# Patient Record
Sex: FEMALE | Race: NATIVE HAWAIIAN OR OTHER PACIFIC ISLANDER | NOT HISPANIC OR LATINO | URBAN - METROPOLITAN AREA
[De-identification: names, ages, dates, MRNs, and addresses within clinical notes are randomized per-mention and may not be internally consistent; named-entity substitution may affect disease eponyms.]

---

## 2017-11-22 ENCOUNTER — HOSPITAL ENCOUNTER (EMERGENCY)
Facility: MEDICAL CENTER | Age: 57
End: 2017-11-22
Attending: EMERGENCY MEDICINE

## 2017-11-22 VITALS
SYSTOLIC BLOOD PRESSURE: 156 MMHG | RESPIRATION RATE: 16 BRPM | OXYGEN SATURATION: 96 % | TEMPERATURE: 97.6 F | DIASTOLIC BLOOD PRESSURE: 74 MMHG | BODY MASS INDEX: 44.57 KG/M2 | WEIGHT: 277.34 LBS | HEIGHT: 66 IN | HEART RATE: 86 BPM

## 2017-11-22 DIAGNOSIS — R22.0 GINGIVAL SWELLING: ICD-10-CM

## 2017-11-22 PROCEDURE — 99283 EMERGENCY DEPT VISIT LOW MDM: CPT

## 2017-11-22 RX ORDER — PENICILLIN V POTASSIUM 500 MG/1
500 TABLET ORAL 4 TIMES DAILY
Qty: 40 TAB | Refills: 0 | Status: SHIPPED | OUTPATIENT
Start: 2017-11-22

## 2017-11-22 RX ORDER — ACETAMINOPHEN 325 MG/1
650 TABLET ORAL EVERY 4 HOURS PRN
COMMUNITY

## 2017-11-22 ASSESSMENT — LIFESTYLE VARIABLES: DO YOU DRINK ALCOHOL: NO

## 2017-11-22 ASSESSMENT — PAIN SCALES - GENERAL: PAINLEVEL_OUTOF10: 7

## 2017-11-22 NOTE — ED NOTES
".  Chief Complaint   Patient presents with   • Oral Swelling     Left upper tooth ulceration, denies drainage. Reports fever at home.     • Nausea     Ambulatory to triage for above. Hx of Emergency Dental Surgery to affected tooth. New to area, doesn't have Dentist established.    Patient educated on triage process and wait time.  Instructed to notify staff for worsening or new symptoms.  Placed in lobby.      .BP (!) 167/80   Pulse 71   Temp 36.2 °C (97.2 °F)   Resp 16   Ht 1.676 m (5' 6\")   Wt (!) 125.8 kg (277 lb 5.4 oz)   SpO2 96%   BMI 44.76 kg/m²     "
Front left upper tooth pain, x 2- 3 days ago, denies seeking dentist. Increase in pain and swelling. States took a tylenol at home prior to coming to ER  
Pt dc home with Rx, vss  
Abdomen soft, nontender, nondistended, bowel sounds present in all 4 quadrants.

## 2017-11-22 NOTE — ED PROVIDER NOTES
ED Provider Note    Scribed for Garrett Gonzalez D.O. by Ruddy Arias. 11/22/2017  3:26 AM    Primary care provider: None  History obtained from: patient, patient's daughter   History limited by: None     CHIEF COMPLAINT  Chief Complaint   Patient presents with   • Oral Swelling     Left upper tooth ulceration, denies drainage. Reports fever at home.     • Nausea        HPI    Brien Costa is a 56 y.o. female who presents to the ED for evaluation of oral swelling onset 1 week ago. Per patient, her swelling is mostly in her left-upper gum. Patient denies any recent injury to the area. She endorses associated subjective fever, oral pain. The patient states her pain has been constant since onset. She has taken Tylenol with only temporary relief to her symptoms. She does not note any exacerbating factors. The patient's daughter reports the patient had a tooth removed 1 year ago in the same area, but she has not had problems since. Patient states she has not seen the dentist since her surgery 1 year ago.  She denies nausea, vomiting, diarrhea, or discharge.       REVIEW OF SYSTEMS  Please see HPI for pertinent positives/negatives.      E.       PAST MEDICAL HISTORY  Past Medical History:   Diagnosis Date   • Diabetes (CMS-HCC)         SURGICAL HISTORY  Past Surgical History:   Procedure Laterality Date   • DENTAL EXTRACTION(S)          SOCIAL HISTORY  Social History     Social History Main Topics   • Smoking status: Never Smoker   • Smokeless tobacco: Never Used   • Alcohol use No   • Drug use: No   • Sexual activity: Not on file        FAMILY HISTORY  History reviewed. No pertinent family history.     CURRENT MEDICATIONS  Home Medications     Reviewed by Kizzy Roman R.N. (Registered Nurse) on 11/22/17 at 0311  Med List Status: Complete   Medication Last Dose Status   acetaminophen (TYLENOL) 325 MG Tab 11/22/2017 Active   metformin (GLUCOPHAGE) 500 MG Tab 11/21/2017 Active                 ALLERGIES  No Known  "Allergies     PHYSICAL EXAM  VITAL SIGNS: BP (!) 167/80   Pulse 71   Temp 36.2 °C (97.2 °F)   Resp 16   Ht 1.676 m (5' 6\")   Wt (!) 125.8 kg (277 lb 5.4 oz)   SpO2 96%   BMI 44.76 kg/m²      Pulse ox interpretation: 96% I interpret this pulse ox as normal     Constitutional: Well developed, well nourished, alert in no apparent distress, nontoxic appearance   HENT: No external signs of trauma, normocephalic, area of slightly darker discoloration with swelling on the left upper gingiva with tenderness to palpation, no drainage/bleeding, oropharynx moist and clear with widely patent airway, no trismus/drooling/stridor, patient speaking with normal voice without difficulty  Eyes: No discharge, no icterus   Neck: Trachea midline, no stridor, no LAD  Cardiovascular: Strong distal pulses and good perfusion   Thorax & Lungs: No respiratory distress   Extremities: No clubbing, no cyanosis, no gross deformity, good ROM, intact distal pulses with brisk cap refill   Skin: Warm, dry, no pallor/cyanosis, no rash noted   Neuro: A/O times 3, no focal deficits noted   Psychiatric: Cooperative            COURSE & MEDICAL DECISION MAKING  Nursing notes, VS, PMSFHx reviewed in chart.     Differential diagnoses considered include but are not limited to: Dental caries, dental fracture/avulsion, abscess, gingivitis, periodontitis, stomatitis       3:34 AM - The patient was seen and examined at bedside. The patient was instructed to follow-up with her dentist as soon as possible. She will be discharged with a prescription for antibiotics. The patient was given return precautions and welcomed to return to the ED with new or worsening symptoms. She understood and verbalized agreement.         Patient presents with her daughter to the ED with above complaint. She was noted to have a small area of swelling and discoloration on the left upper gingiva suspicious for possible infection. Patient will be started on penicillin. She declined " dental block. She was advised to continue with acetaminophen/ibuprofen as needed. She was advised to follow up with dentist ASAP and given return to ED precautions. Patient otherwise in no acute distress and nontoxic in appearance.  She was noted to have elevated blood pressure and will need outpatient follow-up for further management. Patient and her daughter verbalized understanding and agreed with plan of care with no further questions or concerns.      The patient is referred to a primary physician for blood pressure management, diabetic screening, and for all other preventative health concerns.       FINAL IMPRESSION  1. Gingival swelling           DISPOSITION  Patient will be discharged home in stable condition.       FOLLOW UP  Please follow up with dentist ASAP          [facility], Emergency Dept  [address] [facility] [address] [phone]    If symptoms worsen         OUTPATIENT MEDICATIONS  Discharge Medication List as of 11/22/2017  3:37 AM      START taking these medications    Details   penicillin v potassium (VEETID) 500 MG Tab Take 1 Tab by mouth 4 times a day., Disp-40 Tab, R-0, Print Rx Paper                 IRuddy (Scribe), am scribing for, and in the presence of, Garrett Gonzalez D.O..    Electronically signed by: Ruddy Arias (Aye), 11/22/2017    IGarrett D.O. personally performed the services described in this documentation, as scribed by Ruddy Arias in my presence, and it is both accurate and complete.      Portions of this record were made with voice recognition software and by scribes.  Despite my review, spelling/grammar/context errors may still remain.  Interpretation of this chart should be taken in this context.

## 2018-06-26 ENCOUNTER — HOSPITAL ENCOUNTER (EMERGENCY)
Facility: MEDICAL CENTER | Age: 58
End: 2018-06-26
Attending: EMERGENCY MEDICINE

## 2018-06-26 VITALS
WEIGHT: 275.57 LBS | SYSTOLIC BLOOD PRESSURE: 145 MMHG | HEART RATE: 83 BPM | TEMPERATURE: 98.6 F | BODY MASS INDEX: 44.48 KG/M2 | OXYGEN SATURATION: 99 % | RESPIRATION RATE: 20 BRPM | DIASTOLIC BLOOD PRESSURE: 78 MMHG

## 2018-06-26 DIAGNOSIS — K04.7 DENTAL ABSCESS: ICD-10-CM

## 2018-06-26 PROCEDURE — 99283 EMERGENCY DEPT VISIT LOW MDM: CPT

## 2018-06-26 PROCEDURE — 303977 HCHG I & D

## 2018-06-26 RX ORDER — AMOXICILLIN AND CLAVULANATE POTASSIUM 875; 125 MG/1; MG/1
1 TABLET, FILM COATED ORAL 2 TIMES DAILY
Qty: 20 TAB | Refills: 0 | Status: SHIPPED | OUTPATIENT
Start: 2018-06-26 | End: 2018-07-06

## 2018-06-27 NOTE — ED NOTES
PT IS AAOX4, PT IS IN NAD, PT IS BEING D/C. PT WAS GIVEN D/C INSTRUCTIONS AND SHE STATED UNDERSTANDING,. PT IS ABLE TO AMB WOI ASSISTANCE. PT LEFT WITH FAMILY .

## 2018-06-27 NOTE — ED NOTES
Brien [employer]  Chief Complaint   Patient presents with   • Tooth Ache     Pt reports having tooth pain since this morning. Denies injury or trauma. Pt reports taking 1300mg APAP extended release at appx 2050hrs. Pt reports having a dental abscess in the past and this feel similar.    • Hypertension     Pt is not taking hypertension medication but has been told in the past that she has high blood pressure.       Pt ambulatory to triage with above complaint.     BP (!) 206/104 Comment: Pt states Pt has HX of HTN. Not taking any RX for it  Pulse 69   Temp 36.1 °C (96.9 °F)   Resp 18   Wt 125 kg (275 lb 9.2 oz)   SpO2 97%   BMI 44.48 kg/m²     Pt informed of triage process and encouraged to notify staff of any changes or concerns. Pt verbalized understanding of instructions. Apologized for long wait time. Pt placed back in lobby.

## 2018-06-27 NOTE — DISCHARGE INSTRUCTIONS
Abscessed Tooth  An abscessed tooth is an infection around your tooth. It may be caused by holes or damage to the tooth (cavity) or a dental disease. An abscessed tooth causes mild to very bad pain in and around the tooth. See your dentist right away if you have tooth or gum pain.  HOME CARE  · Take your medicine as told. Finish it even if you start to feel better.  · Do not drive after taking pain medicine.  · Rinse your mouth (gargle) often with salt water (¼ teaspoon salt in 8 ounces of warm water).  · Do not apply heat to the outside of your face.  GET HELP RIGHT AWAY IF:   · You have a temperature by mouth above 102° F (38.9° C), not controlled by medicine.  · You have chills and a very bad headache.  · You have problems breathing or swallowing.  · Your mouth will not open.  · You develop puffiness (swelling) on the neck or around the eye.  · Your pain is not helped by medicine.  · Your pain is getting worse instead of better.  MAKE SURE YOU:   · Understand these instructions.  · Will watch your condition.  · Will get help right away if you are not doing well or get worse.  Document Released: 06/05/2009 Document Revised: 03/11/2013 Document Reviewed: 03/27/2012  [employer]® Patient Information ©2014 [employer].

## 2018-06-27 NOTE — ED PROVIDER NOTES
ED Provider Note    Scribed for Mohamud Cowan M.D. by Ruddy Arias. 6/26/2018, 10:45 PM.    Primary care provider: None  Means of arrival: Private Vehicle  History obtained from: Patient  History limited by: None    CHIEF COMPLAINT  Chief Complaint   Patient presents with   • Tooth Ache     Pt reports having tooth pain since this morning. Denies injury or trauma. Pt reports taking 1300mg APAP extended release at appx 2050hrs. Pt reports having a dental abscess in the past and this feel similar.    • Hypertension     Pt is not taking hypertension medication but has been told in the past that she has high blood pressure.       HPI  Brien Costa is a 57 y.o. female who presents to the Emergency Department for evaluation of left-sided dental pain onset this morning. Per patient, she woke up this morning with a sudden onset of dental pain that has been constant since. She reports she has had dental abscesses in the past with similar pain. The patient does not report any associated symptoms. She does not note any exacerbating or alleviating factors. Patient denies fevers, nausea, vomiting, or facial swelling.     The patient is also complaining of hypertension. She reports she has been told she has been hypertensive in the past but is not currently taking any medications. She does not report any associated symptoms.     REVIEW OF SYSTEMS  Pertinent positives include left-sided dental pain, hypertension. Pertinent negatives include no fevers, nausea, vomiting, facial swelling.  See HPI for further details. E.     PAST MEDICAL HISTORY   has a past medical history of Diabetes (CMS-HCC) (HCC).    SURGICAL HISTORY   has a past surgical history that includes dental extraction(s).    SOCIAL HISTORY  Social History   Substance Use Topics   • Smoking status: Never Smoker   • Smokeless tobacco: Never Used   • Alcohol use No      History   Drug Use No       FAMILY HISTORY  No history pertinent to complaint.     CURRENT  MEDICATIONS  No current facility-administered medications on file prior to encounter.      Current Outpatient Prescriptions on File Prior to Encounter   Medication Sig Dispense Refill   • acetaminophen (TYLENOL) 325 MG Tab Take 650 mg by mouth every four hours as needed.     • metformin (GLUCOPHAGE) 500 MG Tab Take 1,000 mg by mouth 2 times a day, with meals.     • penicillin v potassium (VEETID) 500 MG Tab Take 1 Tab by mouth 4 times a day. 40 Tab 0      ALLERGIES  No Known Allergies    PHYSICAL EXAM  VITAL SIGNS: BP (!) 206/104 Comment: Pt states Pt has HX of HTN. Not taking any RX for it  Pulse 69   Temp 36.1 °C (96.9 °F)   Resp 18   Wt 125 kg (275 lb 9.2 oz)   SpO2 97%   BMI 44.48 kg/m²     Constitutional: Well developed, Well nourished, No acute distress, Non-toxic appearance.   HENT: Normocephalic, Atraumatic. 2 small areas of soft tissue swelling surrounding the canine on the maxilla. No buccal swelling or posterior pharyngeal swelling.   Cardiovascular: Regular pulse  Lungs: No respiratory distress  Skin: Warm, Dry, no rash  Extremities: No edema  Neurologic: Alert, appropriate, follows commands  Psychiatric: Affect normal    DIAGNOSTIC STUDIES / PROCEDURES    Incision and Drainage Procedure Note    Indication: Dental abscess    Procedure: Punctures were made over the apex of the lesions with a 20 gauge needle and mild amount purulent material was expressed. Loculations were not present. The patient’s tetanus status was up to date and did not require a booster dose.    The patient tolerated the procedure well.    Complications: None       COURSE & MEDICAL DECISION MAKING  Nursing notes, VS, PMSFHx reviewed in chart.     10:45 PM Patient seen and examined at bedside. Performed an incision and drainage procedure. See above note for further details. The patient was counseled to follow-up with a dentist as soon as possible. Patient was also advised to follow-up with her primary care provider for blood  pressure management, but her blood pressure in the ED is acceptable for discharge. She was given return precautions and welcomed to return to the ED with new or worsening symptoms. The patient understood and verbalized agreement.      Discharge vitals: BP (!) 165/87   Pulse 76   Temp 36.1 °C (96.9 °F)   Resp 20   Wt 125 kg (275 lb 9.2 oz)   SpO2 97%   BMI 44.48 kg/m²      Decision Making:  This is a 57 y.o. year old female who presents with dental pain and 2 areas of soft tissue swelling on the alveolar ridge next to her maxillary canine. The patient had both of these lanced with a needle and ofelia pus return. She will be started on antibiotics and advised follow-up with a dentist as soon as possible. Repeat blood pressure was 165/87.    The patient will return for new or worsening symptoms and is stable at the time of discharge.    The patient is referred to a primary physician for blood pressure management, diabetic screening, and for all other preventative health concerns.      DISPOSITION:  Patient will be discharged home in stable condition.    FOLLOW UP:  dentistry as soon as possible    Today  return for facial swelling or fever        FINAL IMPRESSION  1. Dental abscess     Ruddy DELGADO (Erendiraibprudencio), am scribing for, and in the presence of, Mohamud Cowan M.D..    Electronically signed by: Ruddy Arias (Aye), 6/26/2018    Mohamud DELGADO M.D. personally performed the services described in this documentation, as scribed by Ruddy Arias in my presence, and it is both accurate and complete.    The note accurately reflects work and decisions made by me.  Mohamud Cowan  6/26/2018  11:03 PM

## 2021-03-15 DIAGNOSIS — Z23 NEED FOR VACCINATION: ICD-10-CM
